# Patient Record
Sex: FEMALE | Race: WHITE | NOT HISPANIC OR LATINO | URBAN - METROPOLITAN AREA
[De-identification: names, ages, dates, MRNs, and addresses within clinical notes are randomized per-mention and may not be internally consistent; named-entity substitution may affect disease eponyms.]

---

## 2017-03-16 ENCOUNTER — OFFICE VISIT (OUTPATIENT)
Dept: URGENT CARE | Facility: CLINIC | Age: 32
End: 2017-03-16
Payer: COMMERCIAL

## 2017-03-16 PROCEDURE — 99213 OFFICE O/P EST LOW 20 MIN: CPT

## 2017-03-31 ENCOUNTER — GENERIC CONVERSION - ENCOUNTER (OUTPATIENT)
Dept: OTHER | Facility: OTHER | Age: 32
End: 2017-03-31

## 2017-04-06 ENCOUNTER — GENERIC CONVERSION - ENCOUNTER (OUTPATIENT)
Dept: OTHER | Facility: OTHER | Age: 32
End: 2017-04-06

## 2017-04-17 ENCOUNTER — ALLSCRIPTS OFFICE VISIT (OUTPATIENT)
Dept: OTHER | Facility: OTHER | Age: 32
End: 2017-04-17

## 2017-04-17 DIAGNOSIS — R07.9 CHEST PAIN: ICD-10-CM

## 2017-04-19 ENCOUNTER — GENERIC CONVERSION - ENCOUNTER (OUTPATIENT)
Dept: OTHER | Facility: OTHER | Age: 32
End: 2017-04-19

## 2017-04-21 ENCOUNTER — HOSPITAL ENCOUNTER (OUTPATIENT)
Dept: NON INVASIVE DIAGNOSTICS | Facility: CLINIC | Age: 32
Discharge: HOME | End: 2017-04-21
Payer: COMMERCIAL

## 2017-04-21 DIAGNOSIS — R07.9 CHEST PAIN: ICD-10-CM

## 2017-04-21 PROCEDURE — 93017 CV STRESS TEST TRACING ONLY: CPT

## 2017-04-25 LAB
CHEST PAIN STATEMENT: NORMAL
MAX DIASTOLIC BP: 63 MMHG
MAX HEART RATE: 181 BPM
MAX PREDICTED HEART RATE: 188 BPM
MAX. SYSTOLIC BP: 172 MMHG
PROTOCOL NAME: NORMAL
REASON FOR TERMINATION: NORMAL
TARGET HR FORMULA: NORMAL
TEST INDICATION: NORMAL
TIME IN EXERCISE PHASE: 865 S

## 2017-10-23 ENCOUNTER — ALLSCRIPTS OFFICE VISIT (OUTPATIENT)
Dept: OTHER | Facility: OTHER | Age: 32
End: 2017-10-23

## 2017-10-23 DIAGNOSIS — R07.9 CHEST PAIN: ICD-10-CM

## 2017-10-23 DIAGNOSIS — R00.2 PALPITATIONS: ICD-10-CM

## 2017-10-23 DIAGNOSIS — F41.9 ANXIETY DISORDER: ICD-10-CM

## 2017-10-25 ENCOUNTER — GENERIC CONVERSION - ENCOUNTER (OUTPATIENT)
Dept: OTHER | Facility: OTHER | Age: 32
End: 2017-10-25

## 2017-10-25 ENCOUNTER — HOSPITAL ENCOUNTER (OUTPATIENT)
Dept: NON INVASIVE DIAGNOSTICS | Facility: HOSPITAL | Age: 32
Discharge: HOME | End: 2017-10-25
Payer: COMMERCIAL

## 2017-10-25 DIAGNOSIS — R00.2 PALPITATIONS: ICD-10-CM

## 2017-10-25 DIAGNOSIS — F41.9 ANXIETY DISORDER: ICD-10-CM

## 2017-10-25 DIAGNOSIS — R07.9 CHEST PAIN: ICD-10-CM

## 2017-10-25 LAB
A/G RATIO (HISTORICAL): 2 (ref 1.2–2.2)
ALBUMIN SERPL BCP-MCNC: 4.3 G/DL (ref 3.5–5.5)
ALP SERPL-CCNC: 45 IU/L (ref 39–117)
ALT SERPL W P-5'-P-CCNC: 12 IU/L (ref 0–32)
AST SERPL W P-5'-P-CCNC: 15 IU/L (ref 0–40)
BASOPHILS # BLD AUTO: 0 X10E3/UL (ref 0–0.2)
BASOPHILS # BLD AUTO: 1 %
BILIRUB SERPL-MCNC: 0.9 MG/DL (ref 0–1.2)
BUN SERPL-MCNC: 11 MG/DL (ref 6–20)
BUN/CREA RATIO (HISTORICAL): 12 (ref 9–23)
CALCIUM SERPL-MCNC: 9 MG/DL (ref 8.7–10.2)
CHLORIDE SERPL-SCNC: 104 MMOL/L (ref 96–106)
CHOLEST SERPL-MCNC: 143 MG/DL (ref 100–199)
CHOLEST/HDLC SERPL: 2.2 RATIO UNITS (ref 0–4.4)
CO2 SERPL-SCNC: 23 MMOL/L (ref 18–29)
CREAT SERPL-MCNC: 0.91 MG/DL (ref 0.57–1)
DEPRECATED RDW RBC AUTO: 12.6 % (ref 12.3–15.4)
EGFR AFRICAN AMERICAN (HISTORICAL): 96 ML/MIN/1.73
EGFR-AMERICAN CALC (HISTORICAL): 83 ML/MIN/1.73
EOSINOPHIL # BLD AUTO: 0 X10E3/UL (ref 0–0.4)
EOSINOPHIL # BLD AUTO: 1 %
GLUCOSE SERPL-MCNC: 84 MG/DL (ref 65–99)
HCT VFR BLD AUTO: 36.1 % (ref 34–46.6)
HDLC SERPL-MCNC: 66 MG/DL
HGB BLD-MCNC: 12.1 G/DL (ref 11.1–15.9)
IMM.GRANULOCYTES (CD4/8) (HISTORICAL): 0 %
IMM.GRANULOCYTES (CD4/8) (HISTORICAL): 0 X10E3/UL (ref 0–0.1)
LDLC SERPL CALC-MCNC: 61 MG/DL (ref 0–99)
LYMPHOCYTES # BLD AUTO: 1.5 X10E3/UL (ref 0.7–3.1)
LYMPHOCYTES # BLD AUTO: 33 %
MCH RBC QN AUTO: 30.1 PG (ref 26.6–33)
MCHC RBC AUTO-ENTMCNC: 33.5 G/DL (ref 31.5–35.7)
MCV RBC AUTO: 90 FL (ref 79–97)
MONOCYTES # BLD AUTO: 0.4 X10E3/UL (ref 0.1–0.9)
MONOCYTES (HISTORICAL): 9 %
NEUTROPHILS # BLD AUTO: 2.6 X10E3/UL (ref 1.4–7)
NEUTROPHILS # BLD AUTO: 56 %
PLATELET # BLD AUTO: 212 X10E3/UL (ref 150–379)
POTASSIUM SERPL-SCNC: 4.3 MMOL/L (ref 3.5–5.2)
RBC (HISTORICAL): 4.02 X10E6/UL (ref 3.77–5.28)
SODIUM SERPL-SCNC: 144 MMOL/L (ref 134–144)
TOT. GLOBULIN, SERUM (HISTORICAL): 2.2 G/DL (ref 1.5–4.5)
TOTAL PROTEIN (HISTORICAL): 6.5 G/DL (ref 6–8.5)
TRIGL SERPL-MCNC: 82 MG/DL (ref 0–149)
VLDLC SERPL CALC-MCNC: 16 MG/DL (ref 5–40)
WBC # BLD AUTO: 4.6 X10E3/UL (ref 3.4–10.8)

## 2017-10-25 PROCEDURE — 93225 XTRNL ECG REC<48 HRS REC: CPT

## 2017-10-25 PROCEDURE — 93226 XTRNL ECG REC<48 HR SCAN A/R: CPT

## 2017-10-25 NOTE — PROGRESS NOTES
Assessment  1  Palpitations (785 1) (R00 2)   2  Anxiety (300 00) (F41 9)   3  Left sided chest pain (786 50) (R07 9)    Plan  Anxiety    · Karnya Saunders LCSW (Licensed Clinical ) Co-Management  *  Status: Hold  For - Scheduling  Requested for: [address] provided  : Yes  Anxiety, Left sided chest pain, Palpitations    · (1) CBC/PLT/DIFF; Status:Active; Requested [id number]:[id number];    · (1) COMPREHENSIVE METABOLIC PANEL; Status:Active; Requested CQA:[id number];    · (1) LIPID PANEL, FASTING; Status:Active; Requested [id number];    · (1) T4, FREE; Status:Active; Requested [id number]:[id number];    · (1) TSH; Status:Active; Requested ZTO:[id number];    · HOLTER MONITOR - 24 HOUR; Status:Active; Requested LBS:[id number];   Palpitations    · Holter Monitor - POC; Status:Active - Perform Order; Requested BELL:[id number];   PMH: History of anxiety disorder, PMH: Postpartum depression    · LORazepam 0 5 MG Oral Tablet; TAKE 1 TABLET 3 TIMES DAILY AS NEEDED    Discussion/Summary    I believe that the patient's symptoms are related to anxiety and mild panic attacks  We will schedule the Holter monitor as ordered to evaluate for any underlying cause of the palpitations  She will go for the lab work also as ordered  Will follow up closely with the results  I have referred her to the therapist as ordered to discuss behavioral therapy to help with her anxiety  She is hesitant to start a maintenance medication but I have given her small amount of the generic lorazepam to use as needed for any panic attacks  She will call with any worsening anxiety symptoms are depression  We will see her back as scheduled  Chief Complaint  FOUR DAYS AGO FELT LIGHT HEADED AND NOT FEELING WELL, HAS BEEN NOTICING HEART RATE IS ELEVATED WHEN SHE EXERCISES  History of Present Illness  HPI: Marcelino Mckenzie is a 78-year-old female who presents today for evaluation of increasing heart rate when she exercises   The patient states that 4 days ago she felt lightheaded when she was exercising  She felt her heart rate was elevated  We did see her back in April 2017 for 2 episodes of chest pain with exercise  We did send her for stress test which demonstrated that there is no evidence of ischemia and no chest pain during exertion  has been under a lot of stress  She has noticed that she has less endurance at the gym  She works out at [employer] where she wears a heart rate monitor  She has not been feeling good  She will have episodes where her heart is racing and pounding  She was having as elevation of 180 during exercise  There is tightness in her neck and down her arm  There is no shortness of breath  is no depression and she feels anxious and over whelmed all the time  is not interested in medication at this time  feels her heart racing at night  It was worse at last night  There are no fevers or chills  Anxiety Disorder (Follow-Up): The patient is being seen for follow-up of anxiety  The patient reports doing poorly  She has no comorbid illnesses  She has had no significant interval events  Interval symptoms:  worsened anxiety,-- denies difficulty concentrating,-- denies restlessness,-- worsened panic attacks,-- denies sleep disruption-- and-- denies depression  Associated symptoms: no grandiosity,-- no racing thoughts,-- no periods of euphoria,-- no hallucinations-- and-- no suicidal ideation  The patient is not currently on medication for this problem  Review of Systems    Constitutional: as noted in HPI    ENT: as noted in HPI  Cardiovascular: as noted in HPI  Respiratory: as noted in HPI  Breasts: as noted in HPI  Gastrointestinal: as noted in HPI  Genitourinary: as noted in HPI  Musculoskeletal: as noted in HPI  Active Problems  1  Anxiety (300 00) (F41 9)   2  Birth control (V25 9) (Z30 9)   3  Left sided chest pain (786 50) (R07 9)   4  Never a smoker    Past Medical History  1   History of Allergic conjunctivitis, unspecified laterality (372 14) (H10 10)   2  History of Blood tests for routine general physical examination (V72 62) (Z00 00)   3  History of anxiety disorder (V11 8) (Z86 59)   4  History of Influenza vaccination administered during current admission (V04 81) (Z23)   5  History of Intrauterine pregnancy (V22 2) (Z34 90)   6  History of Postpartum depression (086 69,248) (F53)   7  Screening for cervical cancer (V76 2) (Z12 4)  Active Problems And Past Medical History Reviewed: The active problems and past medical history were reviewed and updated today  Family History  Mother    1  No pertinent family history  Father    2  No pertinent family history  Brother    3  Family history of depression (V17 0) (Z81 8)  Maternal Grandmother    4  Family history of Ovarian cancer  Paternal Grandmother    11  Family history of Lung cancer  Family History Reviewed: The family history was reviewed and updated today  Social History   · Currently working   ·    · Never a smoker   · No drug use   · Occasional alcohol use   · Occupation   · [place]   · Two children  The social history was reviewed and updated today  The social history was reviewed and is unchanged  Surgical History  1  History of Oral Surgery Tooth Extraction  Surgical History Reviewed: The surgical history was reviewed and updated today  Current Meds   1  Ocella 3-0 03 MG Oral Tablet; Take 1 tablet daily Recorded    Allergies  1  Clindamycin   2  dicloxacillin    Vitals   Recorded: [id number] 02:09PM   Heart Rate 88   Systolic 979, RUE, Sitting   Diastolic 94, RUE, Sitting   Height 5 ft 5 in   Weight 144 lb    BMI Calculated 23 96   BSA Calculated 1 72     Physical Exam    Constitutional   General appearance: No acute distress, well appearing and well nourished  Eyes   Conjunctiva and lids: No swelling, erythema or discharge  Pupils and irises: Equal, round and reactive to light      Ears, Nose, Mouth, and Throat External inspection of ears and nose: Normal     Otoscopic examination: Tympanic membranes translucent with normal light reflex  Canals patent without erythema  Nasal mucosa, septum, and turbinates: Normal without edema or erythema  Oropharynx: Normal with no erythema, edema, exudate or lesions  Pulmonary   Respiratory effort: No increased work of breathing or signs of respiratory distress  Auscultation of lungs: Clear to auscultation  Auscultation of the lungs revealed no expiratory wheezing,-- normal expiratory time-- and-- no inspiratory wheezing  no rales or crackles were heard bilaterally  no rhonchi  no friction rub  no wheezing  no diminished breath sounds  no bronchial breath sounds  Cardiovascular   Palpation of heart: Normal PMI, no thrills  Auscultation of heart: Normal rate and rhythm, normal S1 and S2, without murmurs  The heart rate was normal  Heart sounds: normal S1,-- normal S2,-- no S3-- and-- no S4  no murmurs were heard  Examination of extremities for edema and/or varicosities: Normal     Carotid pulses: Normal     Abdomen   Abdomen: Non-tender, no masses  Bowel sounds were normal  The abdomen was soft and nontender  no masses palpated  Liver and spleen: No hepatomegaly or splenomegaly  Lymphatic   Palpation of lymph nodes in neck: No lymphadenopathy  Musculoskeletal   Gait and station: Normal     Digits and nails: Normal without clubbing or cyanosis  Inspection/palpation of joints, bones, and muscles: Normal     Skin   Skin and subcutaneous tissue: Normal without rashes or lesions  Neurologic   Cranial nerves: Cranial nerves 2-12 intact  Reflexes: 2+ and symmetric  Sensation: No sensory loss  Psychiatric   Orientation to person, place, and time: Normal     Mood and affect: Normal          Results/Data  A 12 lead ECG was performed and was normal    Rhythm and rate: ventricular rate is 72 beats per minute, but-- normal sinus rhythm     P-waves: HI interval is normal, but-- the P wave is normal    Axis: the QRS axis is normal    QRS: the QRS is normal    ST segment: the ST segments are normal    Comparison to prior ECGs:  no interval change        Future Appointments    Date/Time Provider Specialty Site   10/26/2017 10:40 AM Muriel Yip LCSW  [facility] [place] [facility]   03/13/2018 01:00 PM Mandi Campos DO Family Medicine [facility] [place]     Signatures   Electronically signed by : Leyla Conner DO; Oct 24 2017  6:10AM EST                       (Author)

## 2017-10-26 LAB
T4 FREE SERPL-MCNC: 1.11 NG/DL (ref 0.82–1.77)
TSH SERPL DL<=0.05 MIU/L-ACNC: 1.8 UIU/ML (ref 0.45–4.5)

## 2017-11-02 ENCOUNTER — GENERIC CONVERSION - ENCOUNTER (OUTPATIENT)
Dept: OTHER | Facility: OTHER | Age: 32
End: 2017-11-02

## 2018-01-05 ENCOUNTER — OFFICE VISIT (OUTPATIENT)
Dept: URGENT CARE | Facility: CLINIC | Age: 33
End: 2018-01-05
Payer: COMMERCIAL

## 2018-01-05 PROCEDURE — 99213 OFFICE O/P EST LOW 20 MIN: CPT

## 2018-01-06 NOTE — PROGRESS NOTES
Assessment   1  Left otitis media (382 9) (H66 92)    Plan   Left otitis media    · Azithromycin 250 MG Oral Tablet; TAKE 2 TABLETS ON DAY 1 THEN TAKE 1    TABLET A DAY FOR 4 DAYS   · Lidocaine Viscous 2 % Mouth/Throat Solution; 3-4 gtt L ear every 2 hrs as needed    for pain    Chief Complaint   1  Ear Pain  Chief Complaint Free Text Note Form: Patient relates has been sick for 3 days with cough, congestion, sinus pain and left ear pain  Denies fever  History of Present Illness   HPI: This patient's chief complaint is left earache  She has had this for the last 3 days  She has mild nasal congestion  No sore throat  Occasional cough  No fever or chills  No headache nausea vomiting  is alert oriented pleasant and in no distress  She does not appear toxic  Pupils equal react to light sclerae white conjunctiva pink  Nose is congested  Throat is clear and moist without inflammation  Right TM is normal  Left TM is red dull and inflamed  Canals are clear  Neck is supple  She does have posterior cervical lymphadenopathy bilaterally  Good skin color and turgor  No skin rash  Hospital Based Practices Required Assessment:      Abuse And Domestic Violence Screen       Yes, the patient is safe at home  -- The patient states no one is hurting them  Depression And Suicide Screen  No, the patient has not had thoughts of hurting themself  No, the patient has not felt depressed in the past 7 days  Prefered Language is  english  Primary Language is  english  Active Problems   1  Anxiety (300 00) (F41 9)   2  Birth control (V25 9) (Z30 9)   3  Left ear pain (388 70) (H92 02)   4  Left sided chest pain (786 50) (R07 9)   5  Never a smoker   6  Palpitations (785 1) (R00 2)    Past Medical History   1  History of Allergic conjunctivitis, unspecified laterality (372 14) (H10 10)   2  History of Blood tests for routine general physical examination (V72 62) (Z00 00)   3   History of anxiety disorder (V11 8) (Z86 59)   4  History of Influenza vaccination administered during current admission (V04 81) (Z23)   5  History of Intrauterine pregnancy (V22 2) (Z34 90)   6  History of Postpartum depression (928 65,906) (F53)   7  Screening for cervical cancer (V76 2) (Z12 4)  Active Problems And Past Medical History Reviewed: The active problems and past medical history were reviewed and updated today  Family History   Mother    1  No pertinent family history  Father    2  No pertinent family history  Brother    3  Family history of depression (V17 0) (Z81 8)  Maternal Grandmother    4  Family history of Ovarian cancer  Paternal Grandmother    11  Family history of Lung cancer  Family History Reviewed: The family history was reviewed and updated today  Social History    · Currently working   ·    · Never a smoker   · No drug use   · Occasional alcohol use   · Occupation   · Two children  Social History Reviewed: The social history was reviewed and updated today  Surgical History   1  History of Oral Surgery Tooth Extraction  Surgical History Reviewed: The surgical history was reviewed and updated today  Current Meds    1  LORazepam 0 5 MG Oral Tablet; TAKE 1 TABLET 3 TIMES DAILY AS NEEDED; Therapy: 24Apr2015 to ([id number]); Last Rx:[id number] Ordered   2  Ocella 3-0 03 MG Oral Tablet; Take 1 tablet daily Recorded  Medication List Reviewed: The medication list was reviewed and updated today  Allergies   1   Clindamycin   2  dicloxacillin    Vitals   Signs   Recorded: 25OFH6014 62:66UD   Systolic: 676, LUE, Sitting  Diastolic: 90, LUE, Sitting  Recorded: [id number] 01:27PM   Temperature: 97 5 F, Tympanic  Heart Rate: 71  Respiration: 16  Height: 5 ft 5 in  Weight: 138 lb   BMI Calculated: 22 96  BSA Calculated: 1 69  O2 Saturation: 100, RA  Pain Scale: 6    Future Appointments      Date/Time Provider Specialty Site   03/13/2018 01:00 PM Lanny cMintyre DO Family Medicine [facility]     Signatures    Electronically signed by : BENJAMIN Mariee ; Jan 5 2018  1:46PM EST                       (Author)

## 2018-01-12 VITALS
DIASTOLIC BLOOD PRESSURE: 94 MMHG | WEIGHT: 144 LBS | HEART RATE: 88 BPM | HEIGHT: 65 IN | BODY MASS INDEX: 23.99 KG/M2 | SYSTOLIC BLOOD PRESSURE: 160 MMHG

## 2018-01-14 VITALS
BODY MASS INDEX: 24.32 KG/M2 | HEART RATE: 68 BPM | HEIGHT: 65 IN | RESPIRATION RATE: 17 BRPM | SYSTOLIC BLOOD PRESSURE: 120 MMHG | DIASTOLIC BLOOD PRESSURE: 78 MMHG | TEMPERATURE: 98.5 F | WEIGHT: 146 LBS

## 2018-01-14 NOTE — PROGRESS NOTES
Assessment    1  Encounter for preventive health examination (V70 0) (Z00 00)    Plan  Health Maintenance    · Begin a limited exercise program ; Status:Complete;   Done: 19EVD2971   · Begin or continue regular aerobic exercise  Gradually work up to at least 3 sessions of 30  minutes of exercise a week ; Status:Complete;   Done: 48MGZ8764   · Decreasing the stress in your life may help your condition improve ; Status:Complete;    Done: 11OFR9178   · Eat a low fat and low cholesterol diet ; Status:Complete;   Done: 29TGW7369   · Eat no more than 30 grams of fat per day ; Status:Complete;   Done: 81XGW4283   · Stretch and warm up your muscles during the first 10 minutes , then cool down your  muscles for the last 10 minutes of exercise ; Status:Complete;   Done: 75XOR9818   · Use a sun block product with an SPF of 15 or more ; Status:Complete;   Done: 25KKZ8454   · We encourage all of our patients to exercise regularly  30 minutes of exercise or physical  activity five or more days a week is recommended for children and adults ;  Status:Complete;   Done: 67JHT7010   · Follow-up visit in 1 year Evaluation and Treatment  Follow-up  Status: Complete  Done:  00JFK9543  PMH: Blood tests for routine general physical examination    · (1) LIPID PANEL FASTING W DIRECT LDL REFLEX; Status:Active; Requested  for:13Jgz2100;     Discussion/Summary  health maintenance visit Currently, she eats a healthy diet and has an adequate exercise regimen   the risks and benefits of cervical cancer screening were discussed cervical cancer screening is current cervical cancer screening is managed by Dr Rodger Cash She was advised to be evaluated by an optometrist and a dentist  Advice and education were given regarding nutrition, aerobic exercise, weight bearing exercise, weight loss, calcium supplements, vitamin D supplements, reproductive health, contraception, cardiovascular risk reduction, tobacco cessation, alcohol use, sunscreen use, self skin examination, helmet use, seat belt use, fall risk reduction and advanced directive planning  Violeta Cárdenas had a completely normal exam as ordered  She'll go for the lipid panel as ordered  The patient will continue to work on her healthy diet and exercise  She will continue with her low fat diet  We will see her back in a year for physical           Possible side effects of new medications were reviewed with the patient/guardian today  Chief Complaint  Complete Physical 27year old female -- Saw GYN in April -- Last Lipids 06/19/2015      History of Present Illness  HM, Adult Female: The patient is being seen for a health maintenance evaluation  General Health: The patient's health since the last visit is described as good  She has regular dental visits  The patient brushes 2-3 time(s) a day  She denies vision problems  Vision care includes wearing glasses and wearing soft contact lenses  Lifestyle:  She consumes a diverse and healthy diet  She does not have any weight concerns  She exercises regularly  She does not use tobacco  She consumes alcohol  She reports occasional alcohol use  She denies drug use  Reproductive health: the patient is premenopausal    Screening: cancer screening reviewed and current  metabolic screening reviewed and current  risk screening reviewed and current  HPI: Catherine Jaramillo is a 50-year-old female presents today for a complete physical  She has had no further anxiety or depression  She is doing well and there have been no hospitalizations  The patient denies any chest pain, shortness of breath, or palpitations  There is no edema  There are no headaches or visual changes  There is no lightheadedness, dizziness, or fainting spells  There are no GI symptoms  She had her GYN in April 2016 and had a normal exam    She is feeling very well and has no new complaints today         Review of Systems    Constitutional: No fever, no chills, feels well, no tiredness, no recent weight gain or weight loss  Eyes: No complaints of eye pain, no red eyes, no eyesight problems, no discharge, no dry eyes, no itching of eyes  ENT: no complaints of earache, no loss of hearing, no nose bleeds, no nasal discharge, no sore throat, no hoarseness  Cardiovascular: No complaints of slow heart rate, no fast heart rate, no chest pain, no palpitations, no leg claudication, no lower extremity edema  Respiratory: No complaints of shortness of breath, no wheezing, no cough, no SOB on exertion, no orthopnea, no PND  Gastrointestinal: No complaints of abdominal pain, no constipation, no nausea or vomiting, no diarrhea, no bloody stools  Genitourinary: No complaints of dysuria, no incontinence, no pelvic pain, no dysmenorrhea, no vaginal discharge or bleeding  Musculoskeletal: No complaints of arthralgias, no myalgias, no joint swelling or stiffness, no limb pain or swelling  Integumentary: No complaints of skin rash or lesions, no itching, no skin wounds, no breast pain or lump  Neurological: No complaints of headache, no confusion, no convulsions, no numbness, no dizziness or fainting, no tingling, no limb weakness, no difficulty walking  Psychiatric: Not suicidal, no sleep disturbance, no anxiety or depression, no change in personality, no emotional problems  Endocrine: No complaints of proptosis, no hot flashes, no muscle weakness, no deepening of the voice, no feelings of weakness  Hematologic/Lymphatic: No complaints of swollen glands, no swollen glands in the neck, does not bleed easily, does not bruise easily  Active Problems    1   Birth control (V25 9) (Z30 9)    Past Medical History    · History of Blood tests for routine general physical examination (V72 62) (Z00 00)   · History of anxiety disorder (V11 8) (Z86 59)   · History of Influenza vaccination administered during current admission (V04 81) (Z23)   · History of Intrauterine pregnancy (V22 2) (Z33 1)   · History of Postpartum depression (009 24,817) (F53)    Surgical History    · History of Oral Surgery Tooth Extraction    Family History  Mother    · No pertinent family history  Father    · No pertinent family history  Brother    · Family history of depression (V17 0) (Z81 8)  Maternal Grandmother    · Family history of Ovarian cancer  Paternal Grandmother    · Family history of Lung cancer    Social History    · Currently working   ·    · Never a smoker   · No drug use   · Occasional alcohol use   · Occupation   · [place]   · Two children    Current Meds   1  Escitalopram Oxalate 10 MG Oral Tablet; TAKE 1 AND 1/2 TABLETS DAILY; Therapy: 24Apr2015 to (Evaluate:30Pvn0868)  Requested for: 22OWA9871; Last   Rx:16Jun2015; Status: ACTIVE - Renewal Denied Ordered   2  LORazepam 0 5 MG Oral Tablet; TAKE 1 TABLET 3 TIMES DAILY AS NEEDED; Therapy: 24Apr2015 to (Evaluate:86Wpp0283); Last VB:88LER4724 Ordered   3  Ocella 3-0 03 MG Oral Tablet; Take 1 tablet daily Recorded    Allergies    1  Clindamycin   2  dicloxacillin    Vitals   Recorded: 34Vuy2641 11:55AM Recorded: 49WBD7737 50:76JA   Systolic 676 969, LUE, Sitting   Diastolic 78 74, LUE, Sitting   Heart Rate 60 60, L Radial   Pulse Quality  Regular   Temperature  98 8 F, Tympanic   LMP  13-Jul-2016   Height  5 ft 5 in   Weight  150 lb    BMI Calculated  24 96   BSA Calculated  1 75     Physical Exam    Constitutional   General appearance: No acute distress, well appearing and well nourished  Eyes   Conjunctiva and lids: No swelling, erythema or discharge  Pupils and irises: Equal, round, reactive to light  Ophthalmoscopic examination: Normal fundi and optic discs  Ears, Nose, Mouth, and Throat   External inspection of ears and nose: Normal     Otoscopic examination: Tympanic membranes translucent with normal light reflex  Canals patent without erythema  Hearing: Normal     Nasal mucosa, septum, and turbinates: Normal without edema or erythema  Lips, teeth, and gums: Normal, good dentition  Oropharynx: Normal with no erythema, edema, exudate or lesions  Neck   Neck: Supple, symmetric, trachea midline, no masses  Thyroid: Normal, no thyromegaly  Pulmonary   Respiratory effort: No increased work of breathing or signs of respiratory distress  Percussion of chest: Normal     Palpation of chest: Normal     Auscultation of lungs: Clear to auscultation  Cardiovascular   Palpation of heart: Normal PMI, no thrills  Auscultation of heart: Normal rate and rhythm, normal S1 and S2, no murmurs  Carotid pulses: 2+ bilaterally  Abdominal aorta: Normal     Femoral pulses: 2+ bilaterally  Pedal pulses: 2+ bilaterally  Examination of extremities for edema and/or varicosities: Normal     Chest   Breasts: Normal, no dimpling or skin changes appreciated  Palpation of breasts and axillae: Normal, no masses palpated  Abdomen   Abdomen: Non-tender, no masses  Liver and spleen: No hepatomegaly or splenomegaly  Stool sample for occult blood: Negative  Lymphatic   Palpation of lymph nodes in neck: No lymphadenopathy  Palpation of lymph nodes in axillae: No lymphadenopathy  Palpation of lymph nodes in groin: No lymphadenopathy  Palpation of lymph nodes in other areas: No lymphadenopathy  Musculoskeletal   Gait and station: Normal     Digits and nails: Normal without clubbing or cyanosis  Joints, bones, and muscles: Normal     Range of motion: Normal     Stability: Normal     Muscle strength/tone: Normal     Skin   Skin and subcutaneous tissue: Normal without rashes or lesions  Palpation of skin and subcutaneous tissue: Normal turgor  Neurologic   Cranial nerves: Cranial nerves II-XII intact  Reflexes: 2+ and symmetric  Sensation: No sensory loss  Psychiatric   Judgment and insight: Normal     Orientation to person, place, and time: Normal     Recent and remote memory: Intact      Mood and affect: Normal  Results/Data  (1) CBC/PLT/DIFF [id number] 12:00AM [facility]     Test Name Result Flag Reference   WBC 5 4 x10E3/uL  3 4-10 8   RBC 4 30 x10E6/uL  3 77-5 28   Hemoglobin 12 7 g/dL  11 1-15 9   Hematocrit 38 1 %  34 0-46  6   MCV 89 fL  79-97   MCH 29 5 pg  26 6-33 0   MCHC 33 3 g/dL  31 5-35 7   RDW 13 4 %  12 3-15 4   Platelets 270 E97W8/ZA  150-379   Neutrophils 59 %     Lymphs 32 %     Monocytes 7 %     Eos 2 %     Basos 0 %     Neutrophils (Absolute) 3 1 x10E3/uL  1 4-7 0   Lymphs (Absolute) 1 7 x10E3/uL  0 7-3 1   Monocytes(Absolute) 0 4 x10E3/uL  0 1-0 9   Eos (Absolute) 0 1 x10E3/uL  0 0-0 4   Baso (Absolute) 0 0 x10E3/uL  0 0-0 2   Immature Granulocytes 0 %     Immature Grans (Abs) 0 0 x10E3/uL  0 0-0 1     (1) COMPREHENSIVE METABOLIC PANEL [id number] 93:57HA [facility]     Test Name Result Flag Reference   Glucose, Serum 87 mg/dL  65-99   BUN 14 mg/dL  6-20   Creatinine, Serum 0 88 mg/dL  0 57-1 00   eGFR If NonAfricn Am 87 mL/min/1 73  >59   eGFR If Africn Am 101 mL/min/1 73  >59   BUN/Creatinine Ratio 16  8-20   Sodium, Serum 140 mmol/L  134-144   Potassium, Serum 4 7 mmol/L  3 5-5 2   Chloride, Serum 101 mmol/L     Carbon Dioxide, Total 22 mmol/L  18-29   Calcium, Serum 8 7 mg/dL  8 7-10 2   Protein, Total, Serum 6 5 g/dL  6 0-8 5   Albumin, Serum 4 2 g/dL  3 5-5 5   Globulin, Total 2 3 g/dL  1 5-4 5   A/G Ratio 1 8  1 1-2 5   Bilirubin, Total 1 1 mg/dL  0 0-1 2   Alkaline Phosphatase, S 43 IU/L     AST (SGOT) 14 IU/L  0-40   ALT (SGPT) 11 IU/L  0-32     (1) TSH [id number] 12:00AM [facility]     Test Name Result Flag Reference   TSH 2 380 uIU/mL  0 450-4 500     (1) URINALYSIS (will reflex a microscopy if leukocytes, occult blood, protein or nitrites are not within normal limits) 31OQJ4252 12:00AM [facility]     Test Name Result Flag Reference   Specific Gravity 1 021  1 005-1 030   pH 6 0  5 0-7 5   Urine-Color Yellow  Yellow   Appearance Clear  Clear   WBC Esterase Negative  Negative Protein Negative  Negative/Trace   Glucose Negative  Negative   Ketones Negative  Negative   Occult Blood Negative  Negative   Bilirubin Negative  Negative   Urobilinogen,Semi-Qn 0 2 EU/dL  0 2-1 0   Nitrite, Urine Negative  Negative   Microscopic Examination Comment     Microscopic follows if indicated         Procedure    Procedure:   Results: 20/20 in both eyes with corrective device, 20/20 in the right eye with corrective device, 20/20 in the left eye with corrective device      Signatures   Electronically signed by : Giovanni Ty DO; Jul 20 2016  1:14PM EST                       (Author)

## 2018-01-15 NOTE — MISCELLANEOUS
Message   Recorded as Task   Date: 11/02/2017 05:42 AM, Created By: Unruly Tirado   Task Name: Go to Result   Assigned To: Susan Andersen   Regarding Patient: Negin Pettit, Status: Active   Comment:    Unruly Tirado - 02 Nov 2017 5:42 AM     TASK CREATED  Please let the patient know that her Holter monitor and lab work were all normalRoel Silvan - 02 Nov 2017 9:22 AM     TASK EDITED  left detailed message for patient  Active Problems    1  Anxiety (300 00) (F41 9)   2  Birth control (V25 9) (Z30 9)   3  Left sided chest pain (786 50) (R07 9)   4  Never a smoker   5  Palpitations (785 1) (R00 2)    Current Meds   1  LORazepam 0 5 MG Oral Tablet; TAKE 1 TABLET 3 TIMES DAILY AS NEEDED; Therapy: 24Apr2015 to (03 17 74 30 53); Last Rx:23Oct2017 Ordered   2  Ocella 3-0 03 MG Oral Tablet; Take 1 tablet daily Recorded    Allergies    1   Clindamycin   2  dicloxacillin    Signatures   Electronically signed by : Kathi Sierra, ; Nov 2 2017  9:22AM EST                       (Author)

## 2018-01-23 VITALS
HEART RATE: 71 BPM | RESPIRATION RATE: 16 BRPM | SYSTOLIC BLOOD PRESSURE: 134 MMHG | OXYGEN SATURATION: 100 % | WEIGHT: 138 LBS | DIASTOLIC BLOOD PRESSURE: 90 MMHG | TEMPERATURE: 97.5 F | BODY MASS INDEX: 22.99 KG/M2 | HEIGHT: 65 IN

## 2018-03-02 DIAGNOSIS — Z00.00 WELL ADULT EXAM: Primary | ICD-10-CM

## 2018-03-10 LAB
ALBUMIN SERPL-MCNC: 4.4 G/DL (ref 3.5–5.5)
ALBUMIN/GLOB SERPL: 1.9 {RATIO} (ref 1.2–2.2)
ALP SERPL-CCNC: 49 IU/L (ref 39–117)
ALT SERPL-CCNC: 16 IU/L (ref 0–32)
AST SERPL-CCNC: 22 IU/L (ref 0–40)
BILIRUB SERPL-MCNC: 0.7 MG/DL (ref 0–1.2)
BUN SERPL-MCNC: 19 MG/DL (ref 6–20)
BUN/CREAT SERPL: 21 (ref 9–23)
CALCIUM SERPL-MCNC: 8.8 MG/DL (ref 8.7–10.2)
CHLORIDE SERPL-SCNC: 98 MMOL/L (ref 96–106)
CHOLEST SERPL-MCNC: 146 MG/DL (ref 100–199)
CHOLEST/HDLC SERPL: 2.2 RATIO UNITS (ref 0–4.4)
CO2 SERPL-SCNC: 23 MMOL/L (ref 18–29)
CREAT SERPL-MCNC: 0.9 MG/DL (ref 0.57–1)
GLOBULIN SER-MCNC: 2.3 G/DL (ref 1.5–4.5)
GLUCOSE SERPL-MCNC: 85 MG/DL (ref 65–99)
HDLC SERPL-MCNC: 67 MG/DL
LDLC SERPL CALC-MCNC: 69 MG/DL (ref 0–99)
POTASSIUM SERPL-SCNC: 4.7 MMOL/L (ref 3.5–5.2)
PROT SERPL-MCNC: 6.7 G/DL (ref 6–8.5)
SL AMB EGFR AFRICAN AMERICAN: 97 ML/MIN/1.73
SL AMB EGFR NON AFRICAN AMERICAN: 84 ML/MIN/1.73
SL AMB VLDL CHOLESTEROL CALC: 10 MG/DL (ref 5–40)
SODIUM SERPL-SCNC: 133 MMOL/L (ref 134–144)
TRIGL SERPL-MCNC: 50 MG/DL (ref 0–149)

## 2018-03-13 ENCOUNTER — OFFICE VISIT (OUTPATIENT)
Dept: FAMILY MEDICINE CLINIC | Facility: CLINIC | Age: 33
End: 2018-03-13
Payer: COMMERCIAL

## 2018-03-13 VITALS
WEIGHT: 142 LBS | HEART RATE: 76 BPM | HEIGHT: 65 IN | SYSTOLIC BLOOD PRESSURE: 120 MMHG | RESPIRATION RATE: 13 BRPM | BODY MASS INDEX: 23.66 KG/M2 | TEMPERATURE: 98.2 F | DIASTOLIC BLOOD PRESSURE: 80 MMHG

## 2018-03-13 DIAGNOSIS — Z00.00 HEALTH MAINTENANCE EXAMINATION: Primary | ICD-10-CM

## 2018-03-13 PROCEDURE — 99395 PREV VISIT EST AGE 18-39: CPT | Performed by: FAMILY MEDICINE

## 2018-03-13 RX ORDER — DROSPIRENONE AND ETHINYL ESTRADIOL 0.03MG-3MG
1 KIT ORAL DAILY
COMMUNITY
End: 2018-03-13

## 2018-03-13 RX ORDER — LORAZEPAM 0.5 MG/1
1 TABLET ORAL 3 TIMES DAILY PRN
COMMUNITY
Start: 2015-04-24 | End: 2018-03-13

## 2018-03-13 NOTE — PATIENT INSTRUCTIONS

## 2018-03-13 NOTE — PROGRESS NOTES
Assessment/Plan:  Health maintenance-the patient had a normal exam today in the office  Her blood work looks excellent  She will follow up with her gynecologist as scheduled for her routine gyn visit  We will see her back in a year again for a physical   She will continue with her healthy diet and exercise  Diagnoses and all orders for this visit:    Health maintenance examination    Other orders  -     Discontinue: LORazepam (ATIVAN) 0 5 mg tablet; Take 1 tablet by mouth 3 (three) times a day as needed  -     Discontinue: drospirenone-ethinyl estradiol (OCELLA) 3-0 03 MG per tablet; Take 1 tablet by mouth daily          Subjective:   Chief Complaint   Patient presents with    Physical Exam     34 yr old       Patient Silverio Owen  is a 34 y o  female  who presents today for a complete physical   Genoveva Olsen is a 27-year-old female presents today for a routine physical   She is seeing her GYN in April 2018  She did stop taking her OCP recently because she has been on it for a long time  Her periods have been off a little  She does feel a little more irritable  She is feeling well  The patient denies any chest pain, shortness of breath, or palpitations  There is no edema  There are no headaches or visual changes  There is no lightheadedness, dizziness, or fainting spells  She is not on any medications  She takes a probiotic once a day- [employer] Life 9  There are no GI symptoms  She is feeling great  There is no anxiety              Patient Active Problem List   Diagnosis    Anxiety     Past Medical History:   Diagnosis Date    Anxiety disorder     last assessed - 14Oct2015    Post partum depression     last assessed - 14Oct2015     Past Surgical History:   Procedure Laterality Date    WISDOM TOOTH EXTRACTION       Allergies   Allergen Reactions    Clindamycin Rash    Dicloxacillin Rash     Family History   Problem Relation Age of Onset    No Known Problems Mother     No Known Problems Father     Depression Brother     Ovarian cancer Maternal Grandmother     Lung cancer Paternal Grandmother      Social History     Social History    Marital status: /Civil Union     Spouse name: N/A    Number of children: 2    Years of education: N/A     Occupational History    Clinical Research      Currently working     Social History Main Topics    Smoking status: Never Smoker    Smokeless tobacco: Never Used    Alcohol use Yes      Comment: occasional alcohol use    Drug use: No    Sexual activity: Not on file     Other Topics Concern    Not on file     Social History Narrative    No narrative on file     No current outpatient prescriptions on file prior to visit  No current facility-administered medications on file prior to visit  The following portions of the patient's history were reviewed and updated as appropriate: allergies, current medications, past family history, past medical history, past social history, past surgical history and problem list   Review of Systems   Constitutional: Negative  HENT: Negative  Eyes: Negative  Respiratory: Negative  Cardiovascular: Negative  Gastrointestinal: Negative  Endocrine: Negative  Genitourinary: Negative  Musculoskeletal: Negative  Skin: Negative  Allergic/Immunologic: Negative  Neurological: Negative  Hematological: Negative  Psychiatric/Behavioral: Negative  Objective:  Vitals:    03/13/18 1317   BP: 120/80   BP Location: Right arm   Patient Position: Sitting   Cuff Size: Standard   Pulse: 76   Resp: 13   Temp: 98 2 °F (36 8 °C)   TempSrc: Tympanic   Weight: 64 4 kg (142 lb)   Height: 5' 5" (1 651 m)     Body mass index is 23 63 kg/m²  Physical Exam   Constitutional: She is oriented to person, place, and time  She appears well-developed and well-nourished  No distress  HENT:   Head: Normocephalic and atraumatic     Right Ear: External ear normal    Left Ear: External ear normal    Nose: Nose normal    Mouth/Throat: Oropharynx is clear and moist  No oropharyngeal exudate  Eyes: EOM are normal  Pupils are equal, round, and reactive to light  Right eye exhibits no discharge  Left eye exhibits no discharge  No scleral icterus  Neck: Normal range of motion  Neck supple  No JVD present  No tracheal deviation present  No thyromegaly present  Cardiovascular: Normal rate, regular rhythm, normal heart sounds and intact distal pulses  Exam reveals no gallop and no friction rub  No murmur heard  Pulmonary/Chest: Effort normal and breath sounds normal  No respiratory distress  She has no wheezes  She has no rales  She exhibits no tenderness  Abdominal: Soft  Bowel sounds are normal  She exhibits no distension and no mass  There is no tenderness  There is no rebound and no guarding  No hernia  Musculoskeletal: Normal range of motion  She exhibits no edema, tenderness or deformity  Lymphadenopathy:     She has no cervical adenopathy  Neurological: She is alert and oriented to person, place, and time  She displays normal reflexes  No cranial nerve deficit or sensory deficit  She exhibits normal muscle tone  Coordination normal    Skin: Skin is warm and dry  No rash noted  She is not diaphoretic  No erythema  No pallor  Psychiatric: She has a normal mood and affect   Her behavior is normal  Thought content normal      Orders Only on 03/02/2018   Component Date Value    SL AMB GLUCOSE 03/09/2018 85     BUN 03/09/2018 19     Creatinine, Serum 03/09/2018 0 90     eGFR Non African American 03/09/2018 84     SL AMB EGFR  AMER* 03/09/2018 97     SL AMB BUN/CREATININE RA* 03/09/2018 21     SL AMB SODIUM 03/09/2018 133*    SL AMB POTASSIUM 03/09/2018 4 7     SL AMB CHLORIDE 03/09/2018 98     SL AMB CARBON DIOXIDE 03/09/2018 23     CALCIUM 03/09/2018 8 8     SL AMB PROTEIN, TOTAL 03/09/2018 6 7     Serum Albumin 03/09/2018 4 4     Globulin, Total 03/09/2018 2 3     SL AMB ALBUMIN/GLOBULIN * 03/09/2018 1 9     SL AMB BILIRUBIN, TOTAL 03/09/2018 0 7     Alk Phos Isoenzymes 03/09/2018 49      AMB AST 03/09/2018 22      AMB ALT 03/09/2018 16     Cholesterol, Total 03/09/2018 146     Triglycerides 03/09/2018 50     SL AMB HDL CHOLESTEROL 03/09/2018 67     SL AMB VLDL CHOLESTEROL * 03/09/2018 10      AMB LDL-CHOLESTEROL 03/09/2018 69     T   Chol/HDL Ratio 03/09/2018 2 2

## 2019-01-31 DIAGNOSIS — Z13.1 SCREENING FOR DIABETES MELLITUS: ICD-10-CM

## 2019-01-31 DIAGNOSIS — Z00.00 HEALTHCARE MAINTENANCE: Primary | ICD-10-CM

## 2019-01-31 DIAGNOSIS — F41.9 ANXIETY: ICD-10-CM

## 2019-01-31 DIAGNOSIS — Z13.6 SCREENING FOR CARDIOVASCULAR CONDITION: ICD-10-CM

## 2019-03-13 LAB
ALBUMIN SERPL-MCNC: 4.6 G/DL (ref 3.5–5.5)
ALBUMIN/GLOB SERPL: 2 {RATIO} (ref 1.2–2.2)
ALP SERPL-CCNC: 44 IU/L (ref 39–117)
ALT SERPL-CCNC: 11 IU/L (ref 0–32)
APPEARANCE UR: CLEAR
AST SERPL-CCNC: 16 IU/L (ref 0–40)
BASOPHILS # BLD AUTO: 0 X10E3/UL (ref 0–0.2)
BASOPHILS NFR BLD AUTO: 0 %
BILIRUB SERPL-MCNC: 0.9 MG/DL (ref 0–1.2)
BILIRUB UR QL STRIP: NEGATIVE
BUN SERPL-MCNC: 17 MG/DL (ref 6–20)
BUN/CREAT SERPL: 19 (ref 9–23)
CALCIUM SERPL-MCNC: 9.2 MG/DL (ref 8.7–10.2)
CHLORIDE SERPL-SCNC: 101 MMOL/L (ref 96–106)
CHOLEST SERPL-MCNC: 163 MG/DL (ref 100–199)
CO2 SERPL-SCNC: 23 MMOL/L (ref 20–29)
COLOR UR: YELLOW
CREAT SERPL-MCNC: 0.89 MG/DL (ref 0.57–1)
EOSINOPHIL # BLD AUTO: 0 X10E3/UL (ref 0–0.4)
EOSINOPHIL NFR BLD AUTO: 0 %
ERYTHROCYTE [DISTWIDTH] IN BLOOD BY AUTOMATED COUNT: 13.8 % (ref 12.3–15.4)
GLOBULIN SER-MCNC: 2.3 G/DL (ref 1.5–4.5)
GLUCOSE SERPL-MCNC: 90 MG/DL (ref 65–99)
GLUCOSE UR QL: NEGATIVE
HCT VFR BLD AUTO: 35.2 % (ref 34–46.6)
HDLC SERPL-MCNC: 80 MG/DL
HGB BLD-MCNC: 11.6 G/DL (ref 11.1–15.9)
HGB UR QL STRIP: NEGATIVE
IMM GRANULOCYTES # BLD: 0 X10E3/UL (ref 0–0.1)
IMM GRANULOCYTES NFR BLD: 0 %
KETONES UR QL STRIP: NEGATIVE
LDLC SERPL CALC-MCNC: 69 MG/DL (ref 0–99)
LDLC/HDLC SERPL: 0.9 RATIO (ref 0–3.2)
LEUKOCYTE ESTERASE UR QL STRIP: NEGATIVE
LYMPHOCYTES # BLD AUTO: 1.6 X10E3/UL (ref 0.7–3.1)
LYMPHOCYTES NFR BLD AUTO: 19 %
MCH RBC QN AUTO: 30.3 PG (ref 26.6–33)
MCHC RBC AUTO-ENTMCNC: 33 G/DL (ref 31.5–35.7)
MCV RBC AUTO: 92 FL (ref 79–97)
MICRO URNS: NORMAL
MONOCYTES # BLD AUTO: 0.4 X10E3/UL (ref 0.1–0.9)
MONOCYTES NFR BLD AUTO: 5 %
NEUTROPHILS # BLD AUTO: 6.2 X10E3/UL (ref 1.4–7)
NEUTROPHILS NFR BLD AUTO: 76 %
NITRITE UR QL STRIP: NEGATIVE
PH UR STRIP: 6.5 [PH] (ref 5–7.5)
PLATELET # BLD AUTO: 237 X10E3/UL (ref 150–379)
POTASSIUM SERPL-SCNC: 4 MMOL/L (ref 3.5–5.2)
PROT SERPL-MCNC: 6.9 G/DL (ref 6–8.5)
PROT UR QL STRIP: NEGATIVE
RBC # BLD AUTO: 3.83 X10E6/UL (ref 3.77–5.28)
SL AMB EGFR AFRICAN AMERICAN: 98 ML/MIN/1.73
SL AMB EGFR NON AFRICAN AMERICAN: 85 ML/MIN/1.73
SL AMB VLDL CHOLESTEROL CALC: 14 MG/DL (ref 5–40)
SODIUM SERPL-SCNC: 137 MMOL/L (ref 134–144)
SP GR UR: 1.01 (ref 1–1.03)
TRIGL SERPL-MCNC: 72 MG/DL (ref 0–149)
TSH SERPL DL<=0.005 MIU/L-ACNC: 1.52 UIU/ML (ref 0.45–4.5)
UROBILINOGEN UR STRIP-ACNC: 0.2 EU/DL (ref 0.2–1)
WBC # BLD AUTO: 8.1 X10E3/UL (ref 3.4–10.8)

## 2019-04-05 ENCOUNTER — OFFICE VISIT (OUTPATIENT)
Dept: FAMILY MEDICINE CLINIC | Facility: CLINIC | Age: 34
End: 2019-04-05
Payer: COMMERCIAL

## 2019-04-05 VITALS
BODY MASS INDEX: 23.69 KG/M2 | SYSTOLIC BLOOD PRESSURE: 114 MMHG | WEIGHT: 142.2 LBS | HEART RATE: 69 BPM | TEMPERATURE: 98.6 F | OXYGEN SATURATION: 99 % | DIASTOLIC BLOOD PRESSURE: 76 MMHG | RESPIRATION RATE: 16 BRPM | HEIGHT: 65 IN

## 2019-04-05 DIAGNOSIS — Z00.00 ANNUAL PHYSICAL EXAM: Primary | ICD-10-CM

## 2019-04-05 PROCEDURE — 99395 PREV VISIT EST AGE 18-39: CPT | Performed by: FAMILY MEDICINE

## 2019-04-05 RX ORDER — DROSPIRENONE AND ETHINYL ESTRADIOL 0.03MG-3MG
KIT ORAL
COMMUNITY
Start: 2019-02-27

## 2019-04-30 ENCOUNTER — OFFICE VISIT (OUTPATIENT)
Dept: URGENT CARE | Facility: CLINIC | Age: 34
End: 2019-04-30
Payer: COMMERCIAL

## 2019-04-30 VITALS
TEMPERATURE: 97.9 F | HEIGHT: 65 IN | RESPIRATION RATE: 20 BRPM | BODY MASS INDEX: 24.53 KG/M2 | SYSTOLIC BLOOD PRESSURE: 127 MMHG | OXYGEN SATURATION: 100 % | WEIGHT: 147.2 LBS | DIASTOLIC BLOOD PRESSURE: 76 MMHG | HEART RATE: 80 BPM

## 2019-04-30 DIAGNOSIS — R50.9 FEVER, UNSPECIFIED FEVER CAUSE: Primary | ICD-10-CM

## 2019-04-30 DIAGNOSIS — B34.9 VIRAL ILLNESS: ICD-10-CM

## 2019-04-30 LAB
SL AMB  POCT GLUCOSE, UA: NORMAL
SL AMB LEUKOCYTE ESTERASE,UA: NORMAL
SL AMB POCT BILIRUBIN,UA: NORMAL
SL AMB POCT BLOOD,UA: NORMAL
SL AMB POCT CLARITY,UA: CLEAR
SL AMB POCT COLOR,UA: YELLOW
SL AMB POCT KETONES,UA: NORMAL
SL AMB POCT NITRITE,UA: NORMAL
SL AMB POCT PH,UA: 7
SL AMB POCT SPECIFIC GRAVITY,UA: 1.01
SL AMB POCT URINE PROTEIN: NORMAL
SL AMB POCT UROBILINOGEN: 0.2

## 2019-04-30 PROCEDURE — 87086 URINE CULTURE/COLONY COUNT: CPT | Performed by: FAMILY MEDICINE

## 2019-04-30 PROCEDURE — 99213 OFFICE O/P EST LOW 20 MIN: CPT | Performed by: FAMILY MEDICINE

## 2019-04-30 PROCEDURE — 87631 RESP VIRUS 3-5 TARGETS: CPT | Performed by: FAMILY MEDICINE

## 2019-05-01 LAB
BACTERIA UR CULT: NORMAL
FLUAV AG SPEC QL: NOT DETECTED
FLUBV AG SPEC QL: NOT DETECTED
RSV B RNA SPEC QL NAA+PROBE: NOT DETECTED

## 2019-08-29 ENCOUNTER — OFFICE VISIT (OUTPATIENT)
Dept: FAMILY MEDICINE CLINIC | Facility: CLINIC | Age: 34
End: 2019-08-29
Payer: COMMERCIAL

## 2019-08-29 VITALS
RESPIRATION RATE: 18 BRPM | HEIGHT: 65 IN | SYSTOLIC BLOOD PRESSURE: 120 MMHG | DIASTOLIC BLOOD PRESSURE: 82 MMHG | BODY MASS INDEX: 23.99 KG/M2 | WEIGHT: 144 LBS | TEMPERATURE: 98.8 F | HEART RATE: 68 BPM

## 2019-08-29 DIAGNOSIS — T73.3XXA FATIGUE DUE TO EXCESSIVE EXERTION, INITIAL ENCOUNTER: Primary | ICD-10-CM

## 2019-08-29 DIAGNOSIS — R53.83 FATIGUE, UNSPECIFIED TYPE: ICD-10-CM

## 2019-08-29 PROCEDURE — 99213 OFFICE O/P EST LOW 20 MIN: CPT | Performed by: FAMILY MEDICINE

## 2019-08-29 NOTE — PROGRESS NOTES
Assessment/Plan:  Fatigue with exertion-we will send the patient for the lab work as ordered to look for underlying causes of fatigue  We will follow up very closely with these results  The patient has had a negative cardiac workup in the past and I doubt this is heart related  I did advise her to add a multivitamin once daily  She will continue to keep herself well hydrated  We will follow up with the results of the testing when available and have further instructions at that time  She will call with any worsening symptoms  Diagnoses and all orders for this visit:    Fatigue due to excessive exertion, initial encounter  -     CBC and differential; Future  -     Comprehensive metabolic panel; Future  -     T4, free; Future  -     TSH, 3rd generation; Future  -     Ferritin; Future  -     EBV acute panel; Future  -     Lyme Antibody Profile with reflex to WB; Future  -     CBC and differential  -     Comprehensive metabolic panel  -     T4, free  -     TSH, 3rd generation  -     Ferritin  -     EBV acute panel  -     Lyme Antibody Profile with reflex to WB    Fatigue, unspecified type  -     CBC and differential; Future  -     Comprehensive metabolic panel; Future  -     T4, free; Future  -     TSH, 3rd generation; Future  -     Ferritin; Future  -     EBV acute panel; Future  -     Lyme Antibody Profile with reflex to WB; Future  -     CBC and differential  -     Comprehensive metabolic panel  -     T4, free  -     TSH, 3rd generation  -     Ferritin  -     EBV acute panel  -     Lyme Antibody Profile with reflex to WB       Return for Recheck  Subjective:   Chief Complaint   Patient presents with    Fatigue     increased after and during exercise        Patient ID: Halie Berumen is a 27 y o  female presents today for increased fatigue  Halie Berumen is a 27 y o  female who presents today complaining of increased fatigue both during and after exercise    The patient has previously undergone cardiac testing for chest pain which included a Holter monitor that was performed in October 2017 and a negative stress test in April 2017  She has been feeling extremely tired after exercise and running and occasionally more winded with activity  She has had this for 2 weeks  She has been having general fatigue for 2 months for no reason  She is not snoring and is sleeping well  There is some occasional chest pressure with exercise  She does get anxious  Her periods are normal   There is no change in her weight  She last had blood work in March 2019  The patient currently denies any nausea, vomiting, or GERD symptoms  she has normal bowel movements and normal urine output  she has a normal appetite  There are no fevers or chills  There are no night sweats  There is no wheezing  There is tiredness after exercising  There are no lightheaded spells  There are no allergy symptoms  Fatigue   This is a new problem  The current episode started more than 1 month ago  The problem occurs constantly  The problem has been gradually worsening  Associated symptoms include fatigue  Pertinent negatives include no abdominal pain, anorexia, arthralgias, change in bowel habit, chest pain, chills, congestion, coughing, diaphoresis, fever, headaches, joint swelling, myalgias, nausea, neck pain, numbness, rash, sore throat, swollen glands, urinary symptoms, vertigo, visual change, vomiting or weakness  The symptoms are aggravated by exertion  She has tried nothing for the symptoms       The following portions of the patient's history were reviewed and updated as appropriate: allergies, current medications, past family history, past medical history, past social history, past surgical history and problem list   Patient Active Problem List   Diagnosis    Anxiety     Past Medical History:   Diagnosis Date    Anxiety disorder     last assessed - [id number]    Post partum depression     last assessed - [id number] Past Surgical History:   Procedure Laterality Date    WISDOM TOOTH EXTRACTION       Allergies   Allergen Reactions    Clindamycin Rash    Dicloxacillin Rash     Family History   Problem Relation Age of Onset    No Known Problems Mother     No Known Problems Father     Depression Brother     Ovarian cancer Maternal Grandmother     Lung cancer Paternal Grandmother      Social History     Socioeconomic History    Marital status: /Civil Union     Spouse name: Not on file    Number of children: 2    Years of education: Not on file    Highest education level: Not on file   Occupational History    Occupation: Clinical Research     Comment: Currently working   Social Needs    Financial resource strain: Not on file    Food insecurity:     Worry: Not on file     Inability: Not on file   Reaction needs:     Medical: Not on file     Non-medical: Not on file   Tobacco Use    Smoking status: Never Smoker    Smokeless tobacco: Never Used   Substance and Sexual Activity    Alcohol use:  Yes     Alcohol/week: 6 0 standard drinks     Types: 6 Standard drinks or equivalent per week     Frequency: 2-4 times a month     Drinks per session: 1 or 2     Comment: occasional alcohol use    Drug use: No    Sexual activity: Not on file   Lifestyle    Physical activity:     Days per week: Not on file     Minutes per session: Not on file    Stress: Not on file   Relationships    Social connections:     Talks on phone: Not on file     Gets together: Not on file     Attends Sikhism service: Not on file     Active member of club or organization: Not on file     Attends meetings of clubs or organizations: Not on file     Relationship status: Not on file    Intimate partner violence:     Fear of current or ex partner: Not on file     Emotionally abused: Not on file     Physically abused: Not on file     Forced sexual activity: Not on file   Other Topics Concern    Not on file   Social History Narrative    Not on file     Current Outpatient Medications on File Prior to Visit   Medication Sig Dispense Refill    Probiotic Product (PROBIOTIC PO) Take by mouth daily      JEROD 3-0 03 MG per tablet        No current facility-administered medications on file prior to visit  Review of Systems   Constitutional: Positive for fatigue  Negative for chills, diaphoresis and fever  HENT: Negative  Negative for congestion and sore throat  Eyes: Negative  Respiratory: Negative  Negative for cough  Cardiovascular: Negative  Negative for chest pain  Gastrointestinal: Negative  Negative for abdominal pain, anorexia, change in bowel habit, nausea and vomiting  Endocrine: Negative  Genitourinary: Negative  Musculoskeletal: Negative  Negative for arthralgias, joint swelling, myalgias and neck pain  Skin: Negative  Negative for rash  Allergic/Immunologic: Negative  Neurological: Negative  Negative for vertigo, weakness, numbness and headaches  Hematological: Negative  Psychiatric/Behavioral: Negative  Objective:  Vitals:    08/29/19 1607 08/29/19 1639   BP: 132/82 120/82   BP Location: Left arm    Patient Position: Sitting    Cuff Size: Standard    Pulse: 72 68   Resp: 18    Temp: 98 8 °F (37 1 °C)    TempSrc: Tympanic    Weight: 65 3 kg (144 lb)    Height: 5' 5" (1 651 m)      Body mass index is 23 96 kg/m²  Physical Exam   Constitutional: She is oriented to person, place, and time  She appears well-developed and well-nourished  HENT:   Head: Normocephalic and atraumatic  Mouth/Throat: No oropharyngeal exudate  Eyes: Pupils are equal, round, and reactive to light  Conjunctivae and EOM are normal    Neck: Normal range of motion  No JVD present  No tracheal deviation present  No thyromegaly present  Cardiovascular: Normal rate, regular rhythm, normal heart sounds and intact distal pulses  Exam reveals no gallop and no friction rub  No murmur heard    Pulmonary/Chest: Effort normal and breath sounds normal  No stridor  No respiratory distress  She has no wheezes  She has no rales  She exhibits no tenderness  Abdominal: Soft  Bowel sounds are normal  She exhibits no distension and no mass  There is no tenderness  There is no rebound and no guarding  Musculoskeletal: Normal range of motion  She exhibits no edema, tenderness or deformity  Lymphadenopathy:     She has no cervical adenopathy  Neurological: She is alert and oriented to person, place, and time  She has normal reflexes  She displays normal reflexes  No cranial nerve deficit  She exhibits normal muscle tone  Coordination normal    Skin: Skin is warm and dry

## 2019-09-05 LAB
ALBUMIN SERPL-MCNC: 4.6 G/DL (ref 3.5–5.5)
ALBUMIN/GLOB SERPL: 1.8 {RATIO} (ref 1.2–2.2)
ALP SERPL-CCNC: 41 IU/L (ref 39–117)
ALT SERPL-CCNC: 14 IU/L (ref 0–32)
AST SERPL-CCNC: 18 IU/L (ref 0–40)
B BURGDOR IGG+IGM SER-ACNC: <0.91 ISR (ref 0–0.9)
BASOPHILS # BLD AUTO: 0 X10E3/UL (ref 0–0.2)
BASOPHILS NFR BLD AUTO: 0 %
BILIRUB SERPL-MCNC: 1.2 MG/DL (ref 0–1.2)
BUN SERPL-MCNC: 16 MG/DL (ref 6–20)
BUN/CREAT SERPL: 16 (ref 9–23)
CALCIUM SERPL-MCNC: 9.4 MG/DL (ref 8.7–10.2)
CHLORIDE SERPL-SCNC: 102 MMOL/L (ref 96–106)
CO2 SERPL-SCNC: 22 MMOL/L (ref 20–29)
CREAT SERPL-MCNC: 0.97 MG/DL (ref 0.57–1)
EBV EA IGG SER-ACNC: 44.6 U/ML (ref 0–8.9)
EBV NA IGG SER IA-ACNC: 293 U/ML (ref 0–17.9)
EBV PATRN SPEC IB-IMP: ABNORMAL
EBV VCA IGG SER IA-ACNC: >600 U/ML (ref 0–17.9)
EBV VCA IGM SER IA-ACNC: <36 U/ML (ref 0–35.9)
EOSINOPHIL # BLD AUTO: 0 X10E3/UL (ref 0–0.4)
EOSINOPHIL NFR BLD AUTO: 1 %
ERYTHROCYTE [DISTWIDTH] IN BLOOD BY AUTOMATED COUNT: 13.4 % (ref 12.3–15.4)
FERRITIN SERPL-MCNC: 60 NG/ML (ref 15–150)
GLOBULIN SER-MCNC: 2.6 G/DL (ref 1.5–4.5)
GLUCOSE SERPL-MCNC: 78 MG/DL (ref 65–99)
HCT VFR BLD AUTO: 40.2 % (ref 34–46.6)
HGB BLD-MCNC: 13.1 G/DL (ref 11.1–15.9)
IMM GRANULOCYTES # BLD: 0 X10E3/UL (ref 0–0.1)
IMM GRANULOCYTES NFR BLD: 0 %
LYMPHOCYTES # BLD AUTO: 1.7 X10E3/UL (ref 0.7–3.1)
LYMPHOCYTES NFR BLD AUTO: 34 %
MCH RBC QN AUTO: 30.7 PG (ref 26.6–33)
MCHC RBC AUTO-ENTMCNC: 32.6 G/DL (ref 31.5–35.7)
MCV RBC AUTO: 94 FL (ref 79–97)
MONOCYTES # BLD AUTO: 0.5 X10E3/UL (ref 0.1–0.9)
MONOCYTES NFR BLD AUTO: 10 %
NEUTROPHILS # BLD AUTO: 2.8 X10E3/UL (ref 1.4–7)
NEUTROPHILS NFR BLD AUTO: 55 %
PLATELET # BLD AUTO: 218 X10E3/UL (ref 150–450)
POTASSIUM SERPL-SCNC: 4.9 MMOL/L (ref 3.5–5.2)
PROT SERPL-MCNC: 7.2 G/DL (ref 6–8.5)
RBC # BLD AUTO: 4.27 X10E6/UL (ref 3.77–5.28)
SL AMB EGFR AFRICAN AMERICAN: 88 ML/MIN/1.73
SL AMB EGFR NON AFRICAN AMERICAN: 76 ML/MIN/1.73
SODIUM SERPL-SCNC: 140 MMOL/L (ref 134–144)
T4 FREE SERPL-MCNC: 1.22 NG/DL (ref 0.82–1.77)
TSH SERPL DL<=0.005 MIU/L-ACNC: 1.72 UIU/ML (ref 0.45–4.5)
WBC # BLD AUTO: 5 X10E3/UL (ref 3.4–10.8)

## 2020-04-05 ENCOUNTER — TELEPHONE (OUTPATIENT)
Dept: OTHER | Facility: OTHER | Age: 35
End: 2020-04-05

## 2020-12-01 ENCOUNTER — OFFICE VISIT (OUTPATIENT)
Dept: FAMILY MEDICINE CLINIC | Facility: CLINIC | Age: 35
End: 2020-12-01
Payer: COMMERCIAL

## 2020-12-01 VITALS
WEIGHT: 145 LBS | TEMPERATURE: 98.8 F | BODY MASS INDEX: 24.16 KG/M2 | HEART RATE: 60 BPM | SYSTOLIC BLOOD PRESSURE: 110 MMHG | DIASTOLIC BLOOD PRESSURE: 60 MMHG | HEIGHT: 65 IN

## 2020-12-01 DIAGNOSIS — Z00.00 ANNUAL PHYSICAL EXAM: Primary | ICD-10-CM

## 2020-12-01 DIAGNOSIS — Z11.4 SCREENING FOR HIV (HUMAN IMMUNODEFICIENCY VIRUS): ICD-10-CM

## 2020-12-01 DIAGNOSIS — R53.83 FATIGUE, UNSPECIFIED TYPE: ICD-10-CM

## 2020-12-01 DIAGNOSIS — Z13.6 SCREENING FOR CARDIOVASCULAR CONDITION: ICD-10-CM

## 2020-12-01 DIAGNOSIS — Z13.1 SCREENING FOR DIABETES MELLITUS: ICD-10-CM

## 2020-12-01 PROCEDURE — 3008F BODY MASS INDEX DOCD: CPT | Performed by: FAMILY MEDICINE

## 2020-12-01 PROCEDURE — 1036F TOBACCO NON-USER: CPT | Performed by: FAMILY MEDICINE

## 2020-12-01 PROCEDURE — 3725F SCREEN DEPRESSION PERFORMED: CPT | Performed by: FAMILY MEDICINE

## 2020-12-01 PROCEDURE — 99395 PREV VISIT EST AGE 18-39: CPT | Performed by: FAMILY MEDICINE

## 2020-12-01 RX ORDER — MULTIVITAMIN
1 CAPSULE ORAL DAILY
COMMUNITY

## 2020-12-03 LAB
25(OH)D3+25(OH)D2 SERPL-MCNC: 39.5 NG/ML (ref 30–100)
ALBUMIN SERPL-MCNC: 4.3 G/DL (ref 3.8–4.8)
ALBUMIN/GLOB SERPL: 1.8 {RATIO} (ref 1.2–2.2)
ALP SERPL-CCNC: 39 IU/L (ref 39–117)
ALT SERPL-CCNC: 16 IU/L (ref 0–32)
AST SERPL-CCNC: 17 IU/L (ref 0–40)
BASOPHILS # BLD AUTO: 0 X10E3/UL (ref 0–0.2)
BASOPHILS NFR BLD AUTO: 1 %
BILIRUB SERPL-MCNC: 1.3 MG/DL (ref 0–1.2)
BUN SERPL-MCNC: 10 MG/DL (ref 6–20)
BUN/CREAT SERPL: 10 (ref 9–23)
CALCIUM SERPL-MCNC: 9 MG/DL (ref 8.7–10.2)
CHLORIDE SERPL-SCNC: 103 MMOL/L (ref 96–106)
CHOLEST SERPL-MCNC: 162 MG/DL (ref 100–199)
CHOLEST/HDLC SERPL: 2 RATIO (ref 0–4.4)
CO2 SERPL-SCNC: 23 MMOL/L (ref 20–29)
CREAT SERPL-MCNC: 1 MG/DL (ref 0.57–1)
EOSINOPHIL # BLD AUTO: 0 X10E3/UL (ref 0–0.4)
EOSINOPHIL NFR BLD AUTO: 1 %
ERYTHROCYTE [DISTWIDTH] IN BLOOD BY AUTOMATED COUNT: 12.1 % (ref 11.7–15.4)
GLOBULIN SER-MCNC: 2.4 G/DL (ref 1.5–4.5)
GLUCOSE SERPL-MCNC: 83 MG/DL (ref 65–99)
HCT VFR BLD AUTO: 37.1 % (ref 34–46.6)
HDLC SERPL-MCNC: 83 MG/DL
HGB BLD-MCNC: 12.6 G/DL (ref 11.1–15.9)
HIV 1+2 AB+HIV1 P24 AG SERPL QL IA: NON REACTIVE
IMM GRANULOCYTES # BLD: 0 X10E3/UL (ref 0–0.1)
IMM GRANULOCYTES NFR BLD: 0 %
LDLC SERPL CALC-MCNC: 59 MG/DL (ref 0–99)
LDLC SERPL DIRECT ASSAY-MCNC: 64 MG/DL (ref 0–99)
LYMPHOCYTES # BLD AUTO: 1.8 X10E3/UL (ref 0.7–3.1)
LYMPHOCYTES NFR BLD AUTO: 34 %
MCH RBC QN AUTO: 31.2 PG (ref 26.6–33)
MCHC RBC AUTO-ENTMCNC: 34 G/DL (ref 31.5–35.7)
MCV RBC AUTO: 92 FL (ref 79–97)
MONOCYTES # BLD AUTO: 0.4 X10E3/UL (ref 0.1–0.9)
MONOCYTES NFR BLD AUTO: 7 %
NEUTROPHILS # BLD AUTO: 3 X10E3/UL (ref 1.4–7)
NEUTROPHILS NFR BLD AUTO: 57 %
PLATELET # BLD AUTO: 209 X10E3/UL (ref 150–450)
POTASSIUM SERPL-SCNC: 4.4 MMOL/L (ref 3.5–5.2)
PROT SERPL-MCNC: 6.7 G/DL (ref 6–8.5)
RBC # BLD AUTO: 4.04 X10E6/UL (ref 3.77–5.28)
SL AMB EGFR AFRICAN AMERICAN: 84 ML/MIN/1.73
SL AMB EGFR NON AFRICAN AMERICAN: 73 ML/MIN/1.73
SL AMB VLDL CHOLESTEROL CALC: 20 MG/DL (ref 5–40)
SODIUM SERPL-SCNC: 138 MMOL/L (ref 134–144)
T4 FREE SERPL-MCNC: 1.21 NG/DL (ref 0.82–1.77)
TRIGL SERPL-MCNC: 115 MG/DL (ref 0–149)
TSH SERPL DL<=0.005 MIU/L-ACNC: 2.85 UIU/ML (ref 0.45–4.5)
WBC # BLD AUTO: 5.2 X10E3/UL (ref 3.4–10.8)

## 2021-03-15 ENCOUNTER — TELEPHONE (OUTPATIENT)
Dept: FAMILY MEDICINE CLINIC | Facility: CLINIC | Age: 36
End: 2021-03-15

## 2021-03-15 DIAGNOSIS — M54.2 NECK PAIN: Primary | ICD-10-CM

## 2021-03-15 NOTE — TELEPHONE ENCOUNTER
Claude Brunt called from [facility]  She asked for an order for patient for her neck  She asked if we could fax it to them  Fax: [phone]   I told her we would call if this was a problem, because she was never seen here for this  Their phone number is [phone]  Thank you

## 2021-04-15 LAB — HBA1C MFR BLD HPLC: 4.6 %
